# Patient Record
Sex: FEMALE | Race: WHITE | NOT HISPANIC OR LATINO | Employment: OTHER | URBAN - METROPOLITAN AREA
[De-identification: names, ages, dates, MRNs, and addresses within clinical notes are randomized per-mention and may not be internally consistent; named-entity substitution may affect disease eponyms.]

---

## 2017-01-10 ENCOUNTER — TRANSFERRED RECORDS (OUTPATIENT)
Dept: HEALTH INFORMATION MANAGEMENT | Facility: CLINIC | Age: 42
End: 2017-01-10

## 2017-01-24 ENCOUNTER — TRANSFERRED RECORDS (OUTPATIENT)
Dept: HEALTH INFORMATION MANAGEMENT | Facility: CLINIC | Age: 42
End: 2017-01-24

## 2017-01-26 ENCOUNTER — TRANSFERRED RECORDS (OUTPATIENT)
Dept: HEALTH INFORMATION MANAGEMENT | Facility: CLINIC | Age: 42
End: 2017-01-26

## 2017-08-23 ENCOUNTER — TRANSFERRED RECORDS (OUTPATIENT)
Dept: HEALTH INFORMATION MANAGEMENT | Facility: CLINIC | Age: 42
End: 2017-08-23

## 2017-12-04 ENCOUNTER — COMMUNICATION - HEALTHEAST (OUTPATIENT)
Dept: SCHEDULING | Facility: CLINIC | Age: 42
End: 2017-12-04

## 2019-10-04 ENCOUNTER — TRANSFERRED RECORDS (OUTPATIENT)
Dept: HEALTH INFORMATION MANAGEMENT | Facility: CLINIC | Age: 44
End: 2019-10-04

## 2022-05-19 ENCOUNTER — OFFICE VISIT (OUTPATIENT)
Dept: OTOLARYNGOLOGY | Facility: CLINIC | Age: 47
End: 2022-05-19
Payer: COMMERCIAL

## 2022-05-19 DIAGNOSIS — J32.0 CHRONIC MAXILLARY SINUSITIS: Primary | ICD-10-CM

## 2022-05-19 PROCEDURE — 99203 OFFICE O/P NEW LOW 30 MIN: CPT | Performed by: OTOLARYNGOLOGY

## 2022-05-19 NOTE — PROGRESS NOTES
HPI: This patient is a 45yo F known to me previously at my private practice a few years ago who presents for re-evaluation of the sinuses. She has chronic allergy issues and has dealt with issues in this [place] climate, but mainly in her home. She and her family relocated to [place] for a while and she did better there in terms of her symptoms, but family circumstances brought them back to [place]. All family members are having symptoms again and they did have their home remediated for mold. She has a longstanding regimen for nasal steroids, irrigations, etc., and has not had sinus surgery in the past. She is concerned about some sort of mold issue in her nose.    Past medical history, surgical history, social history, family history, medications, and allergies have been reviewed with the patient and are documented above.    Review of Systems: a 10-system review was performed. Pertinent positives are noted in the HPI and on a separate scanned document in the chart.    PHYSICAL EXAMINATION:  GEN: no acute distress, normocephalic  EYES: extraocular movements are intact, pupils are equal and round. Sclera clear.   EARS: auricles are normally formed. The external auditory canals are clear with minimal to no cerumen. Tympanic membranes are intact bilaterally with no signs of infection, effusion, retractions, or perforations.  NOSE: anterior nares are patent. There are no masses or lesions. The septum is non-obstructing. No percussive tenderness over the maxillary or frontal sinuses. No polyps  OC/OP: clear, dentition is in good repair but with wear that is consistent with clenching/grinding. The tongue and palate are fully mobile and symmetric. No masses or lesions.  NECK: soft and supple. No lymphadenopathy or masses. Airway is midline.   NEURO: CN VII and XII symmetric. alert and oriented. No spontaneous nystagmus. Gait is normal.  PULM: breathing comfortably on room air, normal chest expansion with respiration  CARDS: no cyanosis  or clubbing. Normal carotid pulses.    IMAGING: none    MEDICAL DECISION-MAKING: This patient is a 45yo F with chronic nasal congestion issues amongst other chronic allergic symptoms and fatigue. Will acquire all of her records in addition to rescanning her sinuses. She has some concerns about some rare post-fungal exposure issues and would like to explore other options for her symptoms. Will refer to the [facility] for another opinion and will call her with my thoughts on her new CT sinus when it is completed.

## 2022-05-19 NOTE — LETTER
5/19/2022         RE: Martina Olson  [address] [address]        Dear Colleague,    Thank you for referring your patient, Martina Olson, to the [facility]. Please see a copy of my visit note below.    HPI: This patient is a 45yo F known to me previously at my private practice a few years ago who presents for re-evaluation of the sinuses. She has chronic allergy issues and has dealt with issues in this [place] climate, but mainly in her home. She and her family relocated to [place] for a while and she did better there in terms of her symptoms, but family circumstances brought them back to [place]. All family members are having symptoms again and they did have their home remediated for mold. She has a longstanding regimen for nasal steroids, irrigations, etc., and has not had sinus surgery in the past. She is concerned about some sort of mold issue in her nose.    Past medical history, surgical history, social history, family history, medications, and allergies have been reviewed with the patient and are documented above.    Review of Systems: a 10-system review was performed. Pertinent positives are noted in the HPI and on a separate scanned document in the chart.    PHYSICAL EXAMINATION:  GEN: no acute distress, normocephalic  EYES: extraocular movements are intact, pupils are equal and round. Sclera clear.   EARS: auricles are normally formed. The external auditory canals are clear with minimal to no cerumen. Tympanic membranes are intact bilaterally with no signs of infection, effusion, retractions, or perforations.  NOSE: anterior nares are patent. There are no masses or lesions. The septum is non-obstructing. No percussive tenderness over the maxillary or frontal sinuses. No polyps  OC/OP: clear, dentition is in good repair but with wear that is consistent with clenching/grinding. The tongue and palate are fully mobile and symmetric. No masses or lesions.  NECK: soft  and supple. No lymphadenopathy or masses. Airway is midline.   NEURO: CN VII and XII symmetric. alert and oriented. No spontaneous nystagmus. Gait is normal.  PULM: breathing comfortably on room air, normal chest expansion with respiration  CARDS: no cyanosis or clubbing. Normal carotid pulses.    IMAGING: none    MEDICAL DECISION-MAKING: This patient is a 47yo F with chronic nasal congestion issues amongst other chronic allergic symptoms and fatigue. Will acquire all of her records in addition to rescanning her sinuses. She has some concerns about some rare post-fungal exposure issues and would like to explore other options for her symptoms. Will refer to the [facility] for another opinion and will call her with my thoughts on her new CT sinus when it is completed.        Again, thank you for allowing me to participate in the care of your patient.        Sincerely,        Shannon Mitchell MD

## 2022-06-14 ENCOUNTER — HOSPITAL ENCOUNTER (OUTPATIENT)
Dept: CT IMAGING | Facility: CLINIC | Age: 47
Discharge: HOME | End: 2022-06-14
Attending: OTOLARYNGOLOGY | Admitting: OTOLARYNGOLOGY
Payer: COMMERCIAL

## 2022-06-14 DIAGNOSIS — J32.0 CHRONIC MAXILLARY SINUSITIS: ICD-10-CM

## 2022-06-14 PROCEDURE — 70486 CT MAXILLOFACIAL W/O DYE: CPT

## 2022-06-28 ENCOUNTER — TELEPHONE (OUTPATIENT)
Dept: OTOLARYNGOLOGY | Facility: CLINIC | Age: 47
End: 2022-06-28

## 2022-06-28 NOTE — TELEPHONE ENCOUNTER
Spoke with Martina about her recent sinus CT.  Per Dr. Mitchell I reported the results below to pt.  Pt expressed understanding of the results.    [facility]      Claudia Arboleda RN  [facility]  ENT  [facility] [address] [address]  [email or web address]   Office:[phone]  Employed by [facility]

## 2022-06-28 NOTE — TELEPHONE ENCOUNTER
----- Message from Shannon Mitchell MD sent at 6/28/2022  1:33 PM CDT -----  Claudia, can you contact Martina and let her know that I've reviewed her sinus scan? The radiologist is commenting on some mucosal thickening associated with her tooth roots, but this is not in any way related to her symptoms. I do not see anything on the scan that would lead me to believe that sinus surgery will improve her overall situation. I think she has to continue to hammer away at the allergy piece.

## 2022-11-29 NOTE — TELEPHONE ENCOUNTER
FUTURE VISIT INFORMATION      FUTURE VISIT INFORMATION:    Date: 2/24/23    Time: 10:30AM    [place] [facility] [place] INFORMATION:    Referring provider:  Shannon Mitchell MD    Referring providers clinic:  [facility]     Reason for visit/diagnosis  per pt / Chronic maxillary sinusitis / referred by Shannon Mitchell MD  / recs in [facility] confirmed    RECORDS REQUESTED FROM:       Clinic name Comments Records Status Imaging Status   [facility]  5/19/22 note from Shannon Mitchell MD [facility]    Imaging  6/14/22 CT Sinus  [facility] PACS   [facility]   [phone] Images  1/26/17 CT Sinus   *unable to [employer]   Tracking via usps: [id number] Scanned in Epic req 11/29/22 11/29/22 3:34PM sent a fax to [facility] for images - Amay   December 19, 2022 11:14 AM Called [facility] to follow up on imaging disc and they said they will give me a called back to see if the request had been received and send it. I will be sending another request to them again too- Alvina   December 19, 2022 11:23 AM - Faxed a 2nd request to [facility] to send Image to [place] - Alvina   December 27, 22 8:33am - Called [facility] again and was talking to Lina regarding the imaging disc that we sent out a request for last month, she said she will find the MA that I talked to last week and have them give me a call and update me about the disc.   12/27/22- sent out a 3rd request with a [employer] shipping label and will follow up again later to see if they received the request and if it was process. - Alvina   12/28/22- Linda from [employer] called back saying she have the imaging disc and will be sending it out via the usps label that I fax over to them yesterday- Alvina

## 2022-12-05 ENCOUNTER — TELEPHONE (OUTPATIENT)
Dept: OTOLARYNGOLOGY | Facility: CLINIC | Age: 47
End: 2022-12-05

## 2022-12-05 NOTE — TELEPHONE ENCOUNTER
Spoke with patient regarding sooner appointment form the wait-list. Rescheduled patient accordingly and patient is aware of date, time and location.-Per Patient

## 2023-01-05 NOTE — PROGRESS NOTES
[facility]  New Patient Visit      Encounter date:   January 6, 2023    Referring Provider:   Shannon Mitchell MD  [address] [place] [address]    Chief Complaint: Chronic maxillary sinusitis    History of Present Illness:   Martina Olson is a 47 year old female who presents for consultation regarding chronic maxillary sinusitis. She is sent by Shannon Mitchell. She has a history of chronic nasal congestion, chronic allergy symptoms, and fatigue.    Today, patient reports that she first had a really serious sinus infection in [facility]. She has mold in her home which she discovered during summer of 2021, so her family moved to [place] for several months. One day in [place], it was cold and then warmed up, and she woke up with hives, asthma, and postnasal drip. She had lots of inflammation and swelling including in her extremities. She notes driving through [place] as a trigger of her asthma and her nasal drip. When she is in [place], she is very congested, but when she is in [place], she experiences post-nasal drip.  She had allergy testing recently which showed allergies to dogs, cats, dust, and several other irritants, and she has been using Flonase, colloidal silver, saline rinses. Most recently, she has been experiencing congestion following URI over the holidays.    Sino-Nasal Outcome Test (SNOT - 22)  DNT     [place] Operative History:  The patient denies any sinonasal surgeries.    Review of systems: A 14-point review of systems has been conducted and was negative for any notable symptoms, except as dictated in the history of present illness.     Medical History:  No past medical history on file.     Surgical History:   No past surgical history on file.     Family History:  Family History   Problem Relation Age of Onset     Hypertension Mother      Hyperlipidemia Mother      Heart Disease Father      Hypertension Father      Hyperlipidemia Father      Cancer Maternal  "Grandmother      Cerebrovascular Disease Maternal Grandfather         Social History:   Social History     Socioeconomic History     Marital status:    Tobacco Use     Smoking status: Never     Smokeless tobacco: Never   Substance and Sexual Activity     Alcohol use: No     Sexual activity: Yes     Partners: Male     Birth control/protection: Other        Physical Exam:  Vital signs: BP (!) 164/82 (BP Location: Right arm, Patient Position: Sitting, Cuff Size: Adult Regular)   Pulse 101   Temp 98.4  F (36.9  C) (Temporal)   Resp 16   Ht 1.588 m (5' 2.5\")   Wt 92.1 kg (203 lb)   SpO2 98%   BMI 36.54 kg/m     General Appearance: No acute distress, appropriate demeanor, conversant  Eyes: moist conjunctivae; EOMI; pupils symmetric; visual acuity grossly intact; no proptosis  Head: normocephalic; overall symmetric appearance without deformity  Face: overall symmetric without deformity; HB I/VI  Ears: Normal appearance of external ear; external meatus normal in appearance; TMs intact without perforation bilaterally;   Nose: No external deformity; inferior turbinates without significant hypertrophy  Oral Cavity/oropharynx: Normal appearance of mucosa; tongue midline; no mass or lesions; oropharynx without obvious mucosal abnormality  Neck: no palpable lymphadenopathy; thyroid without palpable nodules  Lungs: symmetric chest rise; no wheezing  CV: Good distal perfusion; normal hear rate  Extremities: No deformity  Neurologic Exam: Cranial nerves II-XII are grossly intact; no focal deficit    Procedure Note  Procedure performed: Rigid nasal endoscopy  Indication: To evaluate for sinonasal pathology not visualized on routine anterior rhinoscopy  Anesthesia: 4% topical lidocaine with 0.05% oxymetazoline  Description of procedure: A 30 degree, 3 mm rigid endoscope was inserted into bilateral nasal cavities and the nasal valves, nasal cavity, middle meatus, sphenoethmoid recess, and nasopharynx were thoroughly " evaluated for evidence of obstruction, edema, purulence, polyps and/or mass/lesion.     [place]-Mert Endoscopic Scoring System  Endoscopic observation Right Left   Polyps in middle meatus (0 = absent, 1 = restricted to middle meatus, 2 = Beyond middle meatus) 0 0   Discharge (0 = absent, 1 = thin and clear, 2 = thick, purulent) 0 0   Edema (0 = absent, 1 = mild-moderate, 2 = moderate-severe) 1 1   Crusting (0 = absent, 1 = mild-moderate, 2 = moderate-severe) 0 0   Scarring (0= absent, 1 = mild-moderate, 2 = moderate-severe) 0 0   Total 1 1     Findings  RT: Edema of middle turbinate, SER relatively clear, superior post nasal drainage  LT: Edema of middle turbinate, SER clear    The patient tolerated the procedure well without complication.     Laboratory Review:  NA    Imaging Review:  CT sinus 6/14/2022  IMPRESSION:  1.  Moderate mucosal thickening left maxillary sinus in the alveolar recess. There is occlusive soft tissue density material in the left infundibulum.  2.  There is single cell opacification left anterior ethmoidal cells with soft tissue density material occluding the left ethmoidal recess.  3.  Asymmetric lateral lamella with Keros 3 configuration on the right.    Pathology Review:  NA    Assessment/Medical Decision Making:  Recurrent acute sinusitis  Chronic maxillary sinusitis  Nasal obstruction    Plan:  Bilateral endoscopy performed today. I recommended Budesonide rinses once daily as needed. We also briefly discussed the option of future surgical intervention. She can follow up with me in the Spring, when symptoms exacerbations are more likely.    Michael Grewal MD    [facility]  Rhinology  Endoscopic Skull Base Surgery  [facility]  Department of Otolaryngology - Head & Neck Surgery    Scribe Disclosure:  FRANCINE, Ramses Wiseman, am serving as a scribe to document services personally performed by Michael Grewal MD at this visit, based upon the  provider's statements to me. All documentation has been reviewed by the aforementioned provider prior to being entered into the official medical record.

## 2023-01-06 ENCOUNTER — OFFICE VISIT (OUTPATIENT)
Dept: OTOLARYNGOLOGY | Facility: CLINIC | Age: 48
End: 2023-01-06
Attending: OTOLARYNGOLOGY
Payer: COMMERCIAL

## 2023-01-06 VITALS
BODY MASS INDEX: 35.97 KG/M2 | DIASTOLIC BLOOD PRESSURE: 82 MMHG | OXYGEN SATURATION: 98 % | WEIGHT: 203 LBS | SYSTOLIC BLOOD PRESSURE: 164 MMHG | TEMPERATURE: 98.4 F | HEART RATE: 101 BPM | HEIGHT: 63 IN | RESPIRATION RATE: 16 BRPM

## 2023-01-06 DIAGNOSIS — J45.40 MODERATE PERSISTENT REACTIVE AIRWAY DISEASE WITHOUT COMPLICATION: ICD-10-CM

## 2023-01-06 DIAGNOSIS — R09.81 NASAL CONGESTION: ICD-10-CM

## 2023-01-06 DIAGNOSIS — J32.0 CHRONIC MAXILLARY SINUSITIS: Primary | ICD-10-CM

## 2023-01-06 PROCEDURE — 99214 OFFICE O/P EST MOD 30 MIN: CPT | Mod: 25 | Performed by: OTOLARYNGOLOGY

## 2023-01-06 PROCEDURE — 31231 NASAL ENDOSCOPY DX: CPT | Performed by: OTOLARYNGOLOGY

## 2023-01-06 RX ORDER — BUDESONIDE AND FORMOTEROL FUMARATE DIHYDRATE 160; 4.5 UG/1; UG/1
AEROSOL RESPIRATORY (INHALATION)
COMMUNITY
Start: 2022-12-09

## 2023-01-06 RX ORDER — BUDESONIDE 0.5 MG/2ML
INHALANT ORAL
Qty: 60 ML | Refills: 3 | Status: SHIPPED | OUTPATIENT
Start: 2023-01-06 | End: 2023-05-01

## 2023-01-06 RX ORDER — THYROID 60 MG
30 TABLET ORAL DAILY
COMMUNITY
Start: 2022-11-27

## 2023-01-06 ASSESSMENT — PAIN SCALES - GENERAL: PAINLEVEL: MODERATE PAIN (4)

## 2023-01-06 NOTE — TELEPHONE ENCOUNTER
Action     Action Taken January 6, 2023 6:36 AM  [id number]     [facility] and sent to [facility] to be uploaded into PACs. Renetta

## 2023-01-06 NOTE — LETTER
1/6/2023       RE: Martina Olson  [address] [facility] [address]     Dear Colleague,    Thank you for referring your patient, Martina Olson, to the [facility] [place] [facility]. Please see a copy of my visit note below.      [facility]  New Patient Visit      Encounter date:   January 6, 2023    Referring Provider:   Shannon Mitchell MD  [address] [place] [address]    Chief Complaint: Chronic maxillary sinusitis    History of Present Illness:   Martina Olson is a 47 year old female who presents for consultation regarding chronic maxillary sinusitis. She is sent by Shannon Mitchell. She has a history of chronic nasal congestion, chronic allergy symptoms, and fatigue.    Today, patient reports that she first had a really serious sinus infection in [facility]. She has mold in her home which she discovered during summer of 2021, so her family moved to [place] for several months. One day in [place], it was cold and then warmed up, and she woke up with hives, asthma, and postnasal drip. She had lots of inflammation and swelling including in her extremities. She notes driving through [place] as a trigger of her asthma and her nasal drip. When she is in [place], she is very congested, but when she is in [place], she experiences post-nasal drip.  She had allergy testing recently which showed allergies to dogs, cats, dust, and several other irritants, and she has been using Flonase, colloidal silver, saline rinses. Most recently, she has been experiencing congestion following URI over the holidays.    Sino-Nasal Outcome Test (SNOT - 22)  DNT     [place] Operative History:  The patient denies any sinonasal surgeries.    Review of systems: A 14-point review of systems has been conducted and was negative for any notable symptoms, except as dictated in the history of present illness.     Medical  "History:  No past medical history on file.     Surgical History:   No past surgical history on file.     Family History:  Family History   Problem Relation Age of Onset     Hypertension Mother      Hyperlipidemia Mother      Heart Disease Father      Hypertension Father      Hyperlipidemia Father      Cancer Maternal Grandmother      Cerebrovascular Disease Maternal Grandfather         Social History:   Social History     Socioeconomic History     Marital status:    Tobacco Use     Smoking status: Never     Smokeless tobacco: Never   Substance and Sexual Activity     Alcohol use: No     Sexual activity: Yes     Partners: Male     Birth control/protection: Other        Physical Exam:  Vital signs: BP (!) 164/82 (BP Location: Right arm, Patient Position: Sitting, Cuff Size: Adult Regular)   Pulse 101   Temp 98.4  F (36.9  C) (Temporal)   Resp 16   Ht 1.588 m (5' 2.5\")   Wt 92.1 kg (203 lb)   SpO2 98%   BMI 36.54 kg/m     General Appearance: No acute distress, appropriate demeanor, conversant  Eyes: moist conjunctivae; EOMI; pupils symmetric; visual acuity grossly intact; no proptosis  Head: normocephalic; overall symmetric appearance without deformity  Face: overall symmetric without deformity; HB I/VI  Ears: Normal appearance of external ear; external meatus normal in appearance; TMs intact without perforation bilaterally;   Nose: No external deformity; inferior turbinates without significant hypertrophy  Oral Cavity/oropharynx: Normal appearance of mucosa; tongue midline; no mass or lesions; oropharynx without obvious mucosal abnormality  Neck: no palpable lymphadenopathy; thyroid without palpable nodules  Lungs: symmetric chest rise; no wheezing  CV: Good distal perfusion; normal hear rate  Extremities: No deformity  Neurologic Exam: Cranial nerves II-XII are grossly intact; no focal deficit    Procedure Note  Procedure performed: Rigid nasal endoscopy  Indication: To evaluate for sinonasal pathology " not visualized on routine anterior rhinoscopy  Anesthesia: 4% topical lidocaine with 0.05% oxymetazoline  Description of procedure: A 30 degree, 3 mm rigid endoscope was inserted into bilateral nasal cavities and the nasal valves, nasal cavity, middle meatus, sphenoethmoid recess, and nasopharynx were thoroughly evaluated for evidence of obstruction, edema, purulence, polyps and/or mass/lesion.     [place]-Mert Endoscopic Scoring System  Endoscopic observation Right Left   Polyps in middle meatus (0 = absent, 1 = restricted to middle meatus, 2 = Beyond middle meatus) 0 0   Discharge (0 = absent, 1 = thin and clear, 2 = thick, purulent) 0 0   Edema (0 = absent, 1 = mild-moderate, 2 = moderate-severe) 1 1   Crusting (0 = absent, 1 = mild-moderate, 2 = moderate-severe) 0 0   Scarring (0= absent, 1 = mild-moderate, 2 = moderate-severe) 0 0   Total 1 1     Findings  RT: Edema of middle turbinate, SER relatively clear, superior post nasal drainage  LT: Edema of middle turbinate, SER clear    The patient tolerated the procedure well without complication.     Laboratory Review:  NA    Imaging Review:  CT sinus 6/14/2022  IMPRESSION:  1.  Moderate mucosal thickening left maxillary sinus in the alveolar recess. There is occlusive soft tissue density material in the left infundibulum.  2.  There is single cell opacification left anterior ethmoidal cells with soft tissue density material occluding the left ethmoidal recess.  3.  Asymmetric lateral lamella with Keros 3 configuration on the right.    Pathology Review:  NA    Assessment/Medical Decision Making:  Recurrent acute sinusitis  Chronic maxillary sinusitis  Nasal obstruction    Plan:  Bilateral endoscopy performed today. I recommended Budesonide rinses once daily as needed. We also briefly discussed the option of future surgical intervention. She can follow up with me in the Spring, when symptoms exacerbations are more likely.    Michael Grewal MD  Assistant    [facility]  Rhinology  Endoscopic Skull Base Surgery  [facility]  Department of Otolaryngology - Head & Neck Surgery    Scribe Disclosure:  I, Ramses Wiseman, am serving as a scribe to document services personally performed by Michael Grewal MD at this visit, based upon the provider's statements to me. All documentation has been reviewed by the aforementioned provider prior to being entered into the official medical record.       Again, thank you for allowing me to participate in the care of your patient.      Sincerely,    Michael Grewal MD

## 2023-01-06 NOTE — PATIENT INSTRUCTIONS
You were seen in the ENT Clinic today by Dr. Grewal. If you have any questions or concerns after your appointment, please contact us (see below)      2.   The following recommendations have been made based upon your appointment today:   -Budesonide rinses daily when able. Instructions are included below. We have sent a prescription to your preferred pharmacy.    3.   Please return to the ENT clinic in 3 months.           How to Contact Us:  Send a Connolly message to your provider. Our team will respond to you via Connolly. Occasionally, we will need to call you to get further information.  For urgent matters (Monday-Friday), call the ENT Clinic: [phone] and speak with a call center team member - they will route your call appropriately.   If you'd like to speak directly with a nurse, please find our contact information below. We do our best to check voicemail frequently throughout the day, and will work to call you back within 1-2 days. For urgent matters, please use the general clinic phone numbers listed above.        Grecia FRAIRE RN  ENT RN Care Coordinator  Direct: [phone]  Kaitlyn SHIPLEY LPN  Direct: [phone]         [facility]  Department of Otolaryngology       Cleaning of the Nasal or Sinus Cavity    Budesonide (Pulmicort)  -Budesonide is an anti-inflammatory steroid medication used to decrease nasal and sinus inflammation.   -It is dispensed in liquid form in a 2 mL respules (small plastic pouch).   -Although it is manufactured for use with a nebulizer, we intend for you to use it with the NeilMed Sinus Rinse bottle (preferred).   -This medication is filled at your preferred pharmacy for you.      Please follow all steps. Nasal irrigation (cleaning) should be done 2 times/day.    Preparation:  1.  Wash your hands  2.  We suggest that you buy the NeilMed Sinus Rinse Kit  3.  Use distilled water or tap water that has been boiled and brought to room temperature. This is important because serious  infections can result from using tap water that is not clean. These infections are very rare, but it's better to be absolutely safe.  4.  Fill the irrigation bottle with room temperature water (distilled or boiled tap water) and add mixture (pre made packet or homemade recipe).        If you wish to make a homemade recipe:        Mix 1/4 teaspoon salt (kosher,non-iodine salt) with 1/4 teaspoon baking soda in each bottle.  5. Add one respule of budesonide. Mix well to ensure that everything is dissolved.  Cleansing Irrigation/Sinus Rinse:    1.  Lean forward over a sink and insert the rinse bottle applicator into the right side of your nose. Make sure to point the applicator towards the back of your head.     2.  Tilt head down and to the left side.  With your mouth open (breathing through your mouth), gently direct the water around the inside of your nose until clear fluid starts to drain from the opposite nostril.  This is called flushing or irrigation.    3.  When you have used 1/4 to 1/2 or the solution, switch to the left nostril.    4.  To irrigate the left nostril, tilt your head down and to the right side.  With your mouth open (breathing through your mouth),  gently direct the water around the inside of your nose until clear fluid starts to drain from the opposite nostril.     Cleaning the Equipment:  1.  Throw away any leftover solution  2.  Clean the rinse bottle and cap with clear water. Air dry.      Call the ENT Clinic if you have any questions or concerns at [phone]

## 2023-02-24 ENCOUNTER — PRE VISIT (OUTPATIENT)
Dept: OTOLARYNGOLOGY | Facility: CLINIC | Age: 48
End: 2023-02-24

## 2023-04-26 DIAGNOSIS — J45.40 MODERATE PERSISTENT REACTIVE AIRWAY DISEASE WITHOUT COMPLICATION: ICD-10-CM

## 2023-05-01 RX ORDER — BUDESONIDE 0.5 MG/2ML
INHALANT ORAL
Qty: 60 ML | Refills: 2 | Status: SHIPPED | OUTPATIENT
Start: 2023-05-01 | End: 2024-03-13

## 2023-05-01 NOTE — TELEPHONE ENCOUNTER
LCV:1/6/2023  [facility] [place]  NCV:6-30-23  Overdue ACT- FYI to clinic  RF 30 day:1RF- cover til RTC

## 2023-06-30 ENCOUNTER — TELEPHONE (OUTPATIENT)
Dept: OTOLARYNGOLOGY | Facility: CLINIC | Age: 48
End: 2023-06-30

## 2023-06-30 ENCOUNTER — OFFICE VISIT (OUTPATIENT)
Dept: OTOLARYNGOLOGY | Facility: CLINIC | Age: 48
End: 2023-06-30
Payer: COMMERCIAL

## 2023-06-30 VITALS
BODY MASS INDEX: 34.91 KG/M2 | HEIGHT: 63 IN | HEART RATE: 104 BPM | WEIGHT: 197 LBS | DIASTOLIC BLOOD PRESSURE: 83 MMHG | OXYGEN SATURATION: 100 % | SYSTOLIC BLOOD PRESSURE: 126 MMHG

## 2023-06-30 DIAGNOSIS — J45.40 MODERATE PERSISTENT REACTIVE AIRWAY DISEASE WITHOUT COMPLICATION: ICD-10-CM

## 2023-06-30 DIAGNOSIS — J32.0 CHRONIC MAXILLARY SINUSITIS: Primary | ICD-10-CM

## 2023-06-30 DIAGNOSIS — R09.81 NASAL CONGESTION: ICD-10-CM

## 2023-06-30 PROCEDURE — 99000 SPECIMEN HANDLING OFFICE-LAB: CPT | Performed by: PATHOLOGY

## 2023-06-30 PROCEDURE — 99213 OFFICE O/P EST LOW 20 MIN: CPT | Mod: 25 | Performed by: OTOLARYNGOLOGY

## 2023-06-30 PROCEDURE — 87075 CULTR BACTERIA EXCEPT BLOOD: CPT | Performed by: OTOLARYNGOLOGY

## 2023-06-30 PROCEDURE — 31231 NASAL ENDOSCOPY DX: CPT | Performed by: OTOLARYNGOLOGY

## 2023-06-30 PROCEDURE — 87077 CULTURE AEROBIC IDENTIFY: CPT | Performed by: OTOLARYNGOLOGY

## 2023-06-30 RX ORDER — BUDESONIDE 0.5 MG/2ML
INHALANT ORAL
Qty: 120 ML | Refills: 3 | Status: SHIPPED | OUTPATIENT
Start: 2023-06-30 | End: 2023-11-08

## 2023-06-30 ASSESSMENT — PAIN SCALES - GENERAL: PAINLEVEL: NO PAIN (0)

## 2023-06-30 NOTE — LETTER
6/30/2023       RE: Martina Olson  [address] [place] [address]     Dear Colleague,    Thank you for referring your patient, Martina Olson, to the [facility] [place] at [facility]. Please see a copy of my visit note below.      [facility]  Return patient Visit      Encounter date:   June 30 , 2023    Referring Provider:   Shannon Mitchell MD  [address] [email or web address] [address]    Chief Complaint: Chronic maxillary sinusitis    History of Present Illness/Initial visit:   Martina Olson is a 47 year old female who presents for consultation regarding chronic maxillary sinusitis. She is sent by Shannon Mitchell. She has a history of chronic nasal congestion, chronic allergy symptoms, and fatigue.    Today, patient reports that she first had a really serious sinus infection in [facility]. She has mold in her home which she discovered during summer of 2021, so her family moved to [place] for several months. One day in [place], it was cold and then warmed up, and she woke up with hives, asthma, and postnasal drip. She had lots of inflammation and swelling including in her extremities. She notes driving through [place] as a trigger of her asthma and her nasal drip. When she is in [place], she is very congested, but when she is in [place], she experiences post-nasal drip.  She had allergy testing recently which showed allergies to dogs, cats, dust, and several other irritants, and she has been using Flonase, colloidal silver, saline rinses. Most recently, she has been experiencing congestion following URI over the holidays.    Interval visit:  She added budesonide rinse into her regimen and has been doing well with this.  Currently, however, she feels as though she has a sinus infection-she has thick postnasal discharge, cough, voice changes.    Sino-Nasal Outcome Test (SNOT - 22)  DNT     [place]  "Operative History:  The patient denies any sinonasal surgeries.    Review of systems: A 14-point review of systems has been conducted and was negative for any notable symptoms, except as dictated in the history of present illness.     Medical History:  No past medical history on file.     Surgical History:   No past surgical history on file.     Family History:  Family History   Problem Relation Age of Onset     Hypertension Mother      Hyperlipidemia Mother      Heart Disease Father      Hypertension Father      Hyperlipidemia Father      Cancer Maternal Grandmother      Cerebrovascular Disease Maternal Grandfather         Social History:   Social History     Socioeconomic History     Marital status:    Tobacco Use     Smoking status: Never     Smokeless tobacco: Never   Substance and Sexual Activity     Alcohol use: No     Sexual activity: Yes     Partners: Male     Birth control/protection: Other        Physical Exam:  Vital signs: /83 (BP Location: Left arm, Patient Position: Sitting, Cuff Size: Adult Large)   Pulse 104   Ht 1.588 m (5' 2.5\")   Wt 89.4 kg (197 lb)   SpO2 100%   BMI 35.46 kg/m     General Appearance: No acute distress, appropriate demeanor, conversant  Eyes: moist conjunctivae; EOMI; pupils symmetric; visual acuity grossly intact; no proptosis  Head: normocephalic; overall symmetric appearance without deformity  Face: overall symmetric without deformity; HB I/VI  Ears: Normal appearance of external ear; external meatus normal in appearance; TMs intact without perforation bilaterally;   Nose: No external deformity; inferior turbinates without significant hypertrophy  Oral Cavity/oropharynx: Normal appearance of mucosa; tongue midline; no mass or lesions; oropharynx without obvious mucosal abnormality  Neck: no palpable lymphadenopathy; thyroid without palpable nodules  Lungs: symmetric chest rise; no wheezing  CV: Good distal perfusion; normal hear rate  Extremities: No " deformity  Neurologic Exam: Cranial nerves II-XII are grossly intact; no focal deficit    Procedure Note  Procedure performed: Rigid nasal endoscopy  Indication: To evaluate for sinonasal pathology not visualized on routine anterior rhinoscopy  Anesthesia: 4% topical lidocaine with 0.05% oxymetazoline  Description of procedure: A 30 degree, 3 mm rigid endoscope was inserted into bilateral nasal cavities and the nasal valves, nasal cavity, middle meatus, sphenoethmoid recess, and nasopharynx were thoroughly evaluated for evidence of obstruction, edema, purulence, polyps and/or mass/lesion.     [place]-Mert Endoscopic Scoring System  Endoscopic observation Right Left   Polyps in middle meatus (0 = absent, 1 = restricted to middle meatus, 2 = Beyond middle meatus) 0 0   Discharge (0 = absent, 1 = thin and clear, 2 = thick, purulent) 1 2   Edema (0 = absent, 1 = mild-moderate, 2 = moderate-severe) 1 1   Crusting (0 = absent, 1 = mild-moderate, 2 = moderate-severe) 0 0   Scarring (0= absent, 1 = mild-moderate, 2 = moderate-severe) 0 0   Total 2 3     Findings  RT: Edema of middle turbinate, SER relatively clear, mucoid drainage from the posterior middle meatus  LT: There is thick mucopurulence coming from the middle meatus which is cultured\    The patient tolerated the procedure well without complication.     Laboratory Review:  NA    Imaging Review:  CT sinus 6/14/2022  IMPRESSION:  1.  Moderate mucosal thickening left maxillary sinus in the alveolar recess. There is occlusive soft tissue density material in the left infundibulum.  2.  There is single cell opacification left anterior ethmoidal cells with soft tissue density material occluding the left ethmoidal recess.  3.  Asymmetric lateral lamella with Keros 3 configuration on the right.    Pathology Review:  NA    Assessment/Medical Decision Making:  Recurrent acute sinusitis  Chronic maxillary sinusitis  Nasal obstruction    Plan:  Nasal endoscopy today.  Some  smoldering sinus disease, left more so than right.  Culture obtained.  She is hesitant to start antibiotics but we will call her to see how she is doing once the culture result.  Restart budesonide irrigations.  I told her to call in 3 to 4 months as follow-up for my colleagues.  We had a discussion regarding endoscopic sinus surgery today.  She may consider this in the future since her symptoms have been ongoing for so long.     Michael Grewal MD    [facility]  Rhinology  Endoscopic Skull Base Surgery  [facility]  Department of Otolaryngology - Head & Neck Surgery          Again, thank you for allowing me to participate in the care of your patient.      Sincerely,    Michael Grewal MD

## 2023-06-30 NOTE — PROGRESS NOTES
[facility]  Return patient Visit      Encounter date:   June 30 , 2023    Referring Provider:   Shannon Mitchell MD  [address] [place] [address]    Chief Complaint: Chronic maxillary sinusitis    History of Present Illness/Initial visit:   Martina Olson is a 47 year old female who presents for consultation regarding chronic maxillary sinusitis. She is sent by Shannon Mitchell. She has a history of chronic nasal congestion, chronic allergy symptoms, and fatigue.    Today, patient reports that she first had a really serious sinus infection in [facility]. She has mold in her home which she discovered during summer of 2021, so her family moved to [place] for several months. One day in [place], it was cold and then warmed up, and she woke up with hives, asthma, and postnasal drip. She had lots of inflammation and swelling including in her extremities. She notes driving through [place] as a trigger of her asthma and her nasal drip. When she is in [place], she is very congested, but when she is in [place], she experiences post-nasal drip.  She had allergy testing recently which showed allergies to dogs, cats, dust, and several other irritants, and she has been using Flonase, colloidal silver, saline rinses. Most recently, she has been experiencing congestion following URI over the holidays.    Interval visit:  She added budesonide rinse into her regimen and has been doing well with this.  Currently, however, she feels as though she has a sinus infection-she has thick postnasal discharge, cough, voice changes.    Sino-Nasal Outcome Test (SNOT - 22)  DNT     [place] Operative History:  The patient denies any sinonasal surgeries.    Review of systems: A 14-point review of systems has been conducted and was negative for any notable symptoms, except as dictated in the history of present illness.     Medical History:  No past medical history on file.     Surgical History:   No past surgical  "history on file.     Family History:  Family History   Problem Relation Age of Onset     Hypertension Mother      Hyperlipidemia Mother      Heart Disease Father      Hypertension Father      Hyperlipidemia Father      Cancer Maternal Grandmother      Cerebrovascular Disease Maternal Grandfather         Social History:   Social History     Socioeconomic History     Marital status:    Tobacco Use     Smoking status: Never     Smokeless tobacco: Never   Substance and Sexual Activity     Alcohol use: No     Sexual activity: Yes     Partners: Male     Birth control/protection: Other        Physical Exam:  Vital signs: /83 (BP Location: Left arm, Patient Position: Sitting, Cuff Size: Adult Large)   Pulse 104   Ht 1.588 m (5' 2.5\")   Wt 89.4 kg (197 lb)   SpO2 100%   BMI 35.46 kg/m     General Appearance: No acute distress, appropriate demeanor, conversant  Eyes: moist conjunctivae; EOMI; pupils symmetric; visual acuity grossly intact; no proptosis  Head: normocephalic; overall symmetric appearance without deformity  Face: overall symmetric without deformity; HB I/VI  Ears: Normal appearance of external ear; external meatus normal in appearance; TMs intact without perforation bilaterally;   Nose: No external deformity; inferior turbinates without significant hypertrophy  Oral Cavity/oropharynx: Normal appearance of mucosa; tongue midline; no mass or lesions; oropharynx without obvious mucosal abnormality  Neck: no palpable lymphadenopathy; thyroid without palpable nodules  Lungs: symmetric chest rise; no wheezing  CV: Good distal perfusion; normal hear rate  Extremities: No deformity  Neurologic Exam: Cranial nerves II-XII are grossly intact; no focal deficit    Procedure Note  Procedure performed: Rigid nasal endoscopy  Indication: To evaluate for sinonasal pathology not visualized on routine anterior rhinoscopy  Anesthesia: 4% topical lidocaine with 0.05% oxymetazoline  Description of procedure: A 30 " degree, 3 mm rigid endoscope was inserted into bilateral nasal cavities and the nasal valves, nasal cavity, middle meatus, sphenoethmoid recess, and nasopharynx were thoroughly evaluated for evidence of obstruction, edema, purulence, polyps and/or mass/lesion.     Yasmine-Mert Endoscopic Scoring System  Endoscopic observation Right Left   Polyps in middle meatus (0 = absent, 1 = restricted to middle meatus, 2 = Beyond middle meatus) 0 0   Discharge (0 = absent, 1 = thin and clear, 2 = thick, purulent) 1 2   Edema (0 = absent, 1 = mild-moderate, 2 = moderate-severe) 1 1   Crusting (0 = absent, 1 = mild-moderate, 2 = moderate-severe) 0 0   Scarring (0= absent, 1 = mild-moderate, 2 = moderate-severe) 0 0   Total 2 3     Findings  RT: Edema of middle turbinate, SER relatively clear, mucoid drainage from the posterior middle meatus  LT: There is thick mucopurulence coming from the middle meatus which is cultured\    The patient tolerated the procedure well without complication.     Laboratory Review:  NA    Imaging Review:  CT sinus 6/14/2022  IMPRESSION:  1.  Moderate mucosal thickening left maxillary sinus in the alveolar recess. There is occlusive soft tissue density material in the left infundibulum.  2.  There is single cell opacification left anterior ethmoidal cells with soft tissue density material occluding the left ethmoidal recess.  3.  Asymmetric lateral lamella with Keros 3 configuration on the right.    Pathology Review:  NA    Assessment/Medical Decision Making:  Recurrent acute sinusitis  Chronic maxillary sinusitis  Nasal obstruction    Plan:  Nasal endoscopy today.  Some smoldering sinus disease, left more so than right.  Culture obtained.  She is hesitant to start antibiotics but we will call her to see how she is doing once the culture result.  Restart budesonide irrigations.  I told her to call in 3 to 4 months as follow-up for my colleagues.  We had a discussion regarding endoscopic sinus surgery  today.  She may consider this in the future since her symptoms have been ongoing for so long.     Michael Grewal MD    [facility]  Rhinology  Endoscopic Skull Base Surgery  [facility]  Department of Otolaryngology - Head & Neck Surgery

## 2023-06-30 NOTE — PATIENT INSTRUCTIONS
You were seen in the ENT Clinic today by Dr. Grewal. If you have any questions or concerns after your appointment, please contact us (see below)       2.   The following recommendations have been made based upon your appointment today:   -Restart budesonide rinses twice daily. Instructions are included below.   -Cultures of your sinuses have been sent to the lab. We will follow up with you on results once Dr. Grewal has had the opportunity to view them.      3.   Please return to the ENT clinic in 3-4 months with rhinology.           How to Contact Us:  Send a Tooth Bank message to your provider. Our team will respond to you via Tooth Bank. Occasionally, we will need to call you to get further information.  For urgent matters (Monday-Friday), call the ENT Clinic: [phone] and speak with a call center team member - they will route your call appropriately.   If you'd like to speak directly with a nurse, please find our contact information below. We do our best to check voicemail frequently throughout the day, and will work to call you back within 1-2 days. For urgent matters, please use the general clinic phone numbers listed above.        Grecia FRAIRE RN  ENT RN Care Coordinator  Direct: [phone]  Kaitlyn SHIPLEY LPN  Direct: [phone]         [facility]  Department of Otolaryngology         Cleaning of the Nasal or Sinus Cavity    Budesonide (Pulmicort)  -Budesonide is an anti-inflammatory steroid medication used to decrease nasal and sinus inflammation.   -It is dispensed in liquid form in a 2 mL respules (small plastic pouch).   -Although it is manufactured for use with a nebulizer, we intend for you to use it with the NeilMed Sinus Rinse bottle (preferred).   -This medication is filled at your preferred pharmacy for you.      Please follow all steps. Nasal irrigation (cleaning) should be done 2 times/day.    Preparation:  1.  Wash your hands  2.  We suggest that you buy the NeilMed Sinus Rinse Kit  3.  Use distilled  water or tap water that has been boiled and brought to room temperature. This is important because serious infections can result from using tap water that is not clean. These infections are very rare, but it's better to be absolutely safe.  4.  Fill the irrigation bottle with room temperature water (distilled or boiled tap water) and add mixture (pre made packet or homemade recipe).        If you wish to make a homemade recipe:        Mix 1/4 teaspoon salt (kosher,non-iodine salt) with 1/4 teaspoon baking soda in each bottle.  5. Add one respule of budesonide. Mix well to ensure that everything is dissolved.  Cleansing Irrigation/Sinus Rinse:    1.  Lean forward over a sink and insert the rinse bottle applicator into the right side of your nose. Make sure to point the applicator towards the back of your head.     2.  Tilt head down and to the left side.  With your mouth open (breathing through your mouth), gently direct the water around the inside of your nose until clear fluid starts to drain from the opposite nostril.  This is called flushing or irrigation.    3.  When you have used 1/4 to 1/2 or the solution, switch to the left nostril.    4.  To irrigate the left nostril, tilt your head down and to the right side.  With your mouth open (breathing through your mouth),  gently direct the water around the inside of your nose until clear fluid starts to drain from the opposite nostril.     Cleaning the Equipment:  1.  Throw away any leftover solution  2.  Clean the rinse bottle and cap with clear water. Air dry.      Call the ENT Clinic if you have any questions or concerns at [phone]

## 2023-07-03 LAB
BACTERIA SPEC CULT: ABNORMAL
BACTERIA SPEC CULT: ABNORMAL

## 2023-07-07 LAB — BACTERIA SPEC CULT: ABNORMAL

## 2023-07-11 ENCOUNTER — TELEPHONE (OUTPATIENT)
Dept: OTOLARYNGOLOGY | Facility: CLINIC | Age: 48
End: 2023-07-11
Payer: COMMERCIAL

## 2023-07-11 DIAGNOSIS — J32.0 CHRONIC MAXILLARY SINUSITIS: Primary | ICD-10-CM

## 2023-07-11 RX ORDER — CEFDINIR 300 MG/1
300 CAPSULE ORAL 2 TIMES DAILY
Qty: 20 CAPSULE | Refills: 0 | Status: SHIPPED | OUTPATIENT
Start: 2023-07-11

## 2023-07-11 NOTE — TELEPHONE ENCOUNTER
Signed Prescriptions:                        Disp   Refills    cefdinir (OMNICEF) 300 MG capsule          20 cap*0        Sig: Take 1 capsule (300 mg) by mouth 2 times daily  Authorizing Provider: FINA [place]  Ordering User: SHIVANI MANE RN spoke with patient, she states she was feeling better and then her family got sick and she got worse. At this point she feels like she is getting better but would like to fill the script just to have.     Shivani Mane RN on 7/11/2023 at 10:00 AM

## 2023-07-11 NOTE — TELEPHONE ENCOUNTER
----- Message from Michael Grewal MD sent at 7/7/2023 10:23 AM CDT -----  Hi Grecia,   Can we call Martina and let her know about her culture results, see how she's doing and offer her cefdinir 300mg BID for 10 days if she's not doing better?  Renzo

## 2023-08-12 ENCOUNTER — HEALTH MAINTENANCE LETTER (OUTPATIENT)
Age: 48
End: 2023-08-12

## 2023-09-18 ENCOUNTER — TELEPHONE (OUTPATIENT)
Dept: OTOLARYNGOLOGY | Facility: CLINIC | Age: 48
End: 2023-09-18
Payer: COMMERCIAL

## 2023-09-18 NOTE — TELEPHONE ENCOUNTER
Spoke with patient regarding scheduling a Return to clinic with  replacing . Scheduled patient as requested for late December. Sent reminder letter to confirmed address.-Per Patient

## 2023-11-08 DIAGNOSIS — J45.40 MODERATE PERSISTENT REACTIVE AIRWAY DISEASE WITHOUT COMPLICATION: ICD-10-CM

## 2023-11-08 RX ORDER — BUDESONIDE 0.5 MG/2ML
INHALANT ORAL
Qty: 120 ML | Refills: 3 | Status: SHIPPED | OUTPATIENT
Start: 2023-11-08

## 2023-12-21 ENCOUNTER — OFFICE VISIT (OUTPATIENT)
Dept: OTOLARYNGOLOGY | Facility: CLINIC | Age: 48
End: 2023-12-21
Payer: COMMERCIAL

## 2023-12-21 VITALS
DIASTOLIC BLOOD PRESSURE: 89 MMHG | WEIGHT: 208.5 LBS | OXYGEN SATURATION: 99 % | HEIGHT: 63 IN | BODY MASS INDEX: 36.94 KG/M2 | HEART RATE: 102 BPM | SYSTOLIC BLOOD PRESSURE: 143 MMHG

## 2023-12-21 DIAGNOSIS — J32.0 CHRONIC MAXILLARY SINUSITIS: ICD-10-CM

## 2023-12-21 DIAGNOSIS — R09.81 NASAL CONGESTION: Primary | ICD-10-CM

## 2023-12-21 PROCEDURE — 31231 NASAL ENDOSCOPY DX: CPT | Performed by: STUDENT IN AN ORGANIZED HEALTH CARE EDUCATION/TRAINING PROGRAM

## 2023-12-21 PROCEDURE — 99213 OFFICE O/P EST LOW 20 MIN: CPT | Mod: 25 | Performed by: STUDENT IN AN ORGANIZED HEALTH CARE EDUCATION/TRAINING PROGRAM

## 2023-12-21 ASSESSMENT — PAIN SCALES - GENERAL: PAINLEVEL: NO PAIN (0)

## 2023-12-21 NOTE — PROGRESS NOTES
[facility]  Return Visit  Encounter date:   December 21, 2023    Chief Complaint: Chronic maxillary sinusitis     History of Present Illness  Martina Olson is a 48 year old female who presents for consultation regarding chronic maxillary sinusitis. She is sent by Shannon Mitchell. She has a history of chronic nasal congestion, chronic allergy symptoms, and fatigue. Initially, patient reported that she first had a really serious sinus infection in [facility]. She has mold in her home which she discovered during summer of 2021, so her family moved to [place] for several months. One day in [place], it was cold and then warmed up, and she woke up with hives, asthma, and postnasal drip. She had lots of inflammation and swelling including in her extremities. She notes [place] as a trigger of her asthma and her nasal drip. When she is in [place], she is very congested, but when she is in [place], she experiences post-nasal drip.  She had allergy testing recently which showed allergies to dogs, cats, dust, and several other irritants, and she has been using Flonase, colloidal silver, saline rinses. Most recently, she has been experiencing congestion following URI over the holidays.    Interval History (12/21/23):   Martina Olson is a 48 year old female who presents for follow up. She is doing well. She does report having allergy flares recently and is working on figuring out controlling the triggers. In [place] she felt fine with regards to her sinuses. About 1 year ago her symptoms became more intermittent rather than constant. She continues budesonide rinses and other alternative therapies that work well for her.     Sino-Nasal Outcome Test (SNOT - 22)  DNT     Review of systems: A 14-point review of systems has been conducted and is negative for any notable symptoms, except as dictated in the history of present illness.     Physical Exam:  Vital signs: BP (!) 143/89 (BP Location: Left arm,  "Patient Position: Sitting, Cuff Size: Adult Regular)   Pulse 102   Ht 1.6 m (5' 3\")   Wt 94.6 kg (208 lb 8 oz)   SpO2 99%   BMI 36.93 kg/m     General Appearance: No acute distress, appropriate demeanor, conversant  Eyes: moist conjunctivae; EOMI; pupils symmetric; visual acuity grossly intact; no proptosis  Head: normocephalic; overall symmetric appearance without deformity  Face: overall symmetric without deformity  Ears: Normal appearance of external ear  Nose: No external deformity  Oral Cavity/oropharynx: Normal appearance of mucosa  Neck: no palpable lymphadenopathy; thyroid without palpable nodules  Lungs: symmetric chest rise; no wheezing  CV: Good distal perfusion; normal hear rate  Extremities: No deformity  Neurologic Exam: Cranial nerves II-XII are grossly intact    Procedure Note  Procedure performed: Rigid nasal endoscopy  Indication: To evaluate for sinonasal pathology not visualized on routine anterior rhinoscopy  Anesthesia: 4% topical lidocaine with 0.05% oxymetazoline  Description of procedure: A 30 degree, 3 mm rigid endoscope was inserted into bilateral nasal cavities and the nasal valves, nasal cavity, middle meatus, sphenoethmoid recess, and nasopharynx were thoroughly evaluated for evidence of obstruction, edema, purulence, polyps and/or mass/lesion.      [place] Endoscopic Scoring System  Endoscopic observation Right Left   Polyps in middle meatus (0 = absent, 1 = restricted to middle meatus, 2 = Beyond middle meatus) 0 0   Discharge (0 = absent, 1 = thin and clear, 2 = thick, purulent) 0 0   Edema (0 = absent, 1 = mild-moderate, 2 = moderate-severe) 0 0   Crusting (0 = absent, 1 = mild-moderate, 2 = moderate-severe) 0 0   Scarring (0= absent, 1 = mild-moderate, 2 = moderate-severe) 0 0   Total 0 0      Findings  No purulence on either side. Scant mucosal secretions. Overall decreased inflammation and improved exam compared to prior.      The patient tolerated the procedure well " without complication.     Assessment/Medical Decision Making:  Recurrent acute sinusitis  Chronic maxillary sinusitis  Nasal obstruction    Martina returns for a follow up. Her symptoms are current controlled with her nasal regimen include budesonide and saline rinses. Nasal endoscopy today showed an overall improved exam from her last visit with Dr. Grewal.     Plan:  1. She will continue her current sinonasal regimen. I recommended adding coconut oil into saline rinses or she can trial capsaicin nasal spray.   2. She will follow up in 1 year for continued care. She will call for a sooner appointment if her symptoms worsen.     Scribe Disclosure:   I, JESENIA GARRISON, am serving as a scribe; to document services personally performed by Kevyn Eckert MD -based on data collection and the provider's statements to me.     Provider Disclosure:  I agree with above History, Review of Systems, Physical exam and Plan.  I have reviewed the content of the documentation and have edited it as needed. I have personally performed the services documented here and the documentation accurately represents those services and the decisions I have made.      Electronically signed by:  Kevyn Eckert MD    [facility]  Rhinology, Endoscopic Skull Base Surgery  [facility]  Department of Otolaryngology - Head & Neck Surgery    ~~~~~~~~~~~~~~~~~~~~~~~~~~~~~~~~~~~~~~~~~~~~~~~~~~~~~~~~~~~~~~~~~~~~~~~~~~~~~~~~~~~~~~~~~~~~~~~~~~~~~~~~~~~~~~~~~~~~~~~~~~~~~~~~~~~~~~~    No past medical history on file.     No past surgical history on file.     Family History   Problem Relation Age of Onset    Hypertension Mother     Hyperlipidemia Mother     Heart Disease Father     Hypertension Father     Hyperlipidemia Father     Cancer Maternal Grandmother     Cerebrovascular Disease Maternal Grandfather         Social History     Socioeconomic History    Marital status:    Tobacco Use    Smoking status: Never     Smokeless tobacco: Never   Substance and Sexual Activity    Alcohol use: No    Sexual activity: Yes     Partners: Male     Birth control/protection: Other

## 2023-12-21 NOTE — PATIENT INSTRUCTIONS
You were seen in the ENT Clinic today by Dr. Eckert. If you have any questions or concerns after your appointment, please contact us (see below)       2.   Please return to the ENT clinic in 1 year.           How to Contact Us:  Send a 5 examples message to your provider. Our team will respond to you via 5 examples. Occasionally, we will need to call you to get further information.  For urgent matters (Monday-Friday), call the ENT Clinic: [phone] and speak with a call center team member - they will route your call appropriately.   If you'd like to speak directly with a nurse, please find our contact information below. We do our best to check voicemail frequently throughout the day, and will work to call you back within 1-2 days. For urgent matters, please use the general clinic phone numbers listed above.        Grecia FRAIRE RN  ENT RN Care Coordinator  Direct: [phone]  Kaitlyn SHIPLEY LPN  Direct: [phone]         [facility]  Department of Otolaryngology

## 2023-12-21 NOTE — LETTER
12/21/2023       RE: Martina Olson  [address] [address]     Dear Colleague,    Thank you for referring your patient, Martina Olson, to the [facility] [place] at [facility]. Please see a copy of my visit note below.      [facility]  Return Visit  Encounter date:   December 21, 2023    Chief Complaint: Chronic maxillary sinusitis     History of Present Illness  Martina Olson is a 48 year old female who presents for consultation regarding chronic maxillary sinusitis. She is sent by Shannon Mitchell. She has a history of chronic nasal congestion, chronic allergy symptoms, and fatigue. Initially, patient reported that she first had a really serious sinus infection in [employer]. She has mold in her home which she discovered during summer of 2021, so her family moved to [place] for several months. One day in [place], it was cold and then warmed up, and she woke up with hives, asthma, and postnasal drip. She had lots of inflammation and swelling including in her extremities. She [place] a trigger of her asthma and her nasal drip. When she is in [place], she is very congested, but when she is in [place], she experiences post-nasal drip.  She had allergy testing recently which showed allergies to dogs, cats, dust, and several other irritants, and she has been using Flonase, colloidal silver, saline rinses. Most recently, she has been experiencing congestion following URI over the holidays.    Interval History (12/21/23):   Martina Olson is a 48 year old female who presents for follow up. She is doing well. She does report having allergy flares recently and is working on figuring out controlling the triggers. In [place] she felt fine with regards to her sinuses. About 1 year ago her symptoms became more intermittent rather than constant. She continues budesonide rinses and other alternative  "therapies that work well for her.     Sino-Nasal Outcome Test (SNOT - 22)  DNT     Review of systems: A 14-point review of systems has been conducted and is negative for any notable symptoms, except as dictated in the history of present illness.     Physical Exam:  Vital signs: BP (!) 143/89 (BP Location: Left arm, Patient Position: Sitting, Cuff Size: Adult Regular)   Pulse 102   Ht 1.6 m (5' 3\")   Wt 94.6 kg (208 lb 8 oz)   SpO2 99%   BMI 36.93 kg/m     General Appearance: No acute distress, appropriate demeanor, conversant  Eyes: moist conjunctivae; EOMI; pupils symmetric; visual acuity grossly intact; no proptosis  Head: normocephalic; overall symmetric appearance without deformity  Face: overall symmetric without deformity  Ears: Normal appearance of external ear  Nose: No external deformity  Oral Cavity/oropharynx: Normal appearance of mucosa  Neck: no palpable lymphadenopathy; thyroid without palpable nodules  Lungs: symmetric chest rise; no wheezing  CV: Good distal perfusion; normal hear rate  Extremities: No deformity  Neurologic Exam: Cranial nerves II-XII are grossly intact    Procedure Note  Procedure performed: Rigid nasal endoscopy  Indication: To evaluate for sinonasal pathology not visualized on routine anterior rhinoscopy  Anesthesia: 4% topical lidocaine with 0.05% oxymetazoline  Description of procedure: A 30 degree, 3 mm rigid endoscope was inserted into bilateral nasal cavities and the nasal valves, nasal cavity, middle meatus, sphenoethmoid recess, and nasopharynx were thoroughly evaluated for evidence of obstruction, edema, purulence, polyps and/or mass/lesion.      [place]ert Endoscopic Scoring System  Endoscopic observation Right Left   Polyps in middle meatus (0 = absent, 1 = restricted to middle meatus, 2 = Beyond middle meatus) 0 0   Discharge (0 = absent, 1 = thin and clear, 2 = thick, purulent) 0 0   Edema (0 = absent, 1 = mild-moderate, 2 = moderate-severe) 0 0   Crusting (0 = " absent, 1 = mild-moderate, 2 = moderate-severe) 0 0   Scarring (0= absent, 1 = mild-moderate, 2 = moderate-severe) 0 0   Total 0 0      Findings  No purulence on either side. Scant mucosal secretions. Overall decreased inflammation and improved exam compared to prior.      The patient tolerated the procedure well without complication.     Assessment/Medical Decision Making:  Recurrent acute sinusitis  Chronic maxillary sinusitis  Nasal obstruction    Martina returns for a follow up. Her symptoms are current controlled with her nasal regimen include budesonide and saline rinses. Nasal endoscopy today showed an overall improved exam from her last visit with Dr. Grewal.     Plan:  1. She will continue her current sinonasal regimen. I recommended adding coconut oil into saline rinses or she can trial capsaicin nasal spray.   2. She will follow up in 1 year for continued care. She will call for a sooner appointment if her symptoms worsen.     Scribe Disclosure:   I, JESENIA GARRISON, am serving as a scribe; to document services personally performed by Kevyn Eckert MD -based on data collection and the provider's statements to me.     Provider Disclosure:  I agree with above History, Review of Systems, Physical exam and Plan.  I have reviewed the content of the documentation and have edited it as needed. I have personally performed the services documented here and the documentation accurately represents those services and the decisions I have made.      Electronically signed by:  Kevyn Eckert MD    [facility]  Rhinology, Endoscopic Skull Base Surgery  [facility]  Department of Otolaryngology - Head & Neck Surgery    ~~~~~~~~~~~~~~~~~~~~~~~~~~~~~~~~~~~~~~~~~~~~~~~~~~~~~~~~~~~~~~~~~~~~~~~~~~~~~~~~~~~~~~~~~~~~~~~~~~~~~~~~~~~~~~~~~~~~~~~~~~~~~~~~~~~~~~~    No past medical history on file.     No past surgical history on file.     Family History   Problem Relation Age of Onset     Hypertension Mother     Hyperlipidemia Mother     Heart Disease Father     Hypertension Father     Hyperlipidemia Father     Cancer Maternal Grandmother     Cerebrovascular Disease Maternal Grandfather         Social History     Socioeconomic History    Marital status:    Tobacco Use    Smoking status: Never    Smokeless tobacco: Never   Substance and Sexual Activity    Alcohol use: No    Sexual activity: Yes     Partners: Male     Birth control/protection: Other          Again, thank you for allowing me to participate in the care of your patient.      Sincerely,    Kevyn Eckert MD

## 2023-12-21 NOTE — NURSING NOTE
"Chief Complaint   Patient presents with    RECHECK   Blood pressure (!) 143/89, pulse 102, height 1.6 m (5' 3\"), weight 94.6 kg (208 lb 8 oz), SpO2 99%. Brendan Larson, EMT    "

## 2024-03-04 ENCOUNTER — TELEPHONE (OUTPATIENT)
Dept: OTOLARYNGOLOGY | Facility: CLINIC | Age: 49
End: 2024-03-04

## 2024-03-04 NOTE — TELEPHONE ENCOUNTER
Records Requested     March 4, 2024 10:47 AM   Alvina Francis  [facility]    Outcome Called and ask the  to send Pt X-ray teeth to us to view      Imaging Received  March 6, 2024 2:31 PM Alvina   Action: Imaging disc from [facility] received and taken to hamida for upload.

## 2024-03-13 DIAGNOSIS — J45.40 MODERATE PERSISTENT REACTIVE AIRWAY DISEASE WITHOUT COMPLICATION: ICD-10-CM

## 2024-03-14 RX ORDER — BUDESONIDE 0.5 MG/2ML
INHALANT ORAL
Qty: 180 ML | Refills: 1 | Status: SHIPPED | OUTPATIENT
Start: 2024-03-14 | End: 2024-06-13

## 2024-09-29 ENCOUNTER — HEALTH MAINTENANCE LETTER (OUTPATIENT)
Age: 49
End: 2024-09-29

## 2024-10-01 ENCOUNTER — TELEPHONE (OUTPATIENT)
Dept: RADIATION ONCOLOGY | Facility: CLINIC | Age: 49
End: 2024-10-01

## 2025-08-31 ENCOUNTER — HEALTH MAINTENANCE LETTER (OUTPATIENT)
Age: 50
End: 2025-08-31